# Patient Record
(demographics unavailable — no encounter records)

---

## 2024-11-07 NOTE — REASON FOR VISIT
[Spouse] : spouse [de-identified] : L2-L3 laminectomy and evacuation of EDH [de-identified] : 10/23/24

## 2024-11-07 NOTE — REASON FOR VISIT
[Spouse] : spouse [de-identified] : L2-L3 laminectomy and evacuation of EDH [de-identified] : 10/23/24

## 2024-11-07 NOTE — HISTORY OF PRESENT ILLNESS
[FreeTextEntry1] : s/p laminectomy for synovial cyst excision doing well radicular pain gone left quad strength improving ambulating well wound clean dry and intact return in 4 weeks will start PT

## 2024-12-02 NOTE — ASSESSMENT
[FreeTextEntry1] : Doing well.   Will start outpatient PT.   F/u in 6-8 weeks.   Will call barring any issues.    I personally reviewed with the PA, this patient's history and physical exam findings, as documented above. I have discussed the relevant areas of concern, having direct implications to the presenting problems and illnesses, and I have personally examined all pertinent and positive and negative findings, which impact their neurosurgical treatment.  MS MAYELA Fabian-C Senior Physician Assistant [facility]    Rose Morin MD FAANS Chair, Department of Neurosurgery [facility]

## 2024-12-02 NOTE — HISTORY OF PRESENT ILLNESS
[FreeTextEntry1] : Patient is doing well.   Preop pain / radiculopathy and lower extremity weakness has improved.  Reports some pain / spasms in the left buttock / thigh by the end of the day. This is bothersome at the time, but tolerable and relieved with rest.    Incision healing well. No signs of erythema, infection, or discharge.   Motor exam 5/5.   Gait steady, walking without assistance.

## 2024-12-23 NOTE — HISTORY OF PRESENT ILLNESS
[TextBox_4] : 65 yo M with PMHx of elevated PSA (followed by Urologist), Spinal Stenosis SP L2-L3 Laminectomy 6 weeks ago, GERD presents with complaint of BERGER noted after climbing 15-20 steps, patient states he feels winded afterwards, however able to walks "miles" on flat surface, is active at the gym with weight lifting.   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

## 2025-01-15 NOTE — ASSESSMENT
[FreeTextEntry1] : Is a 64-year-old male who presents for neurosurgical postoperative assessment status post L2-3 laminectomy for excision of synovial cyst.  As mentioned previously, he had evidence of a left L2-3 synovial cyst with significant compression on the nerve roots of the cauda equina.  Patient had presented to the emergency department with significant proximal weakness as well as radicular features which correlated with his radiographic findings.  He has responded quite well postoperatively and there is no additional interventions that are warranted at this time.  We will have him return to the office in approximately 2 to 3 months and can likely return to us as needed after that time.  He can contact me with any concerns in the interim.  CARLOS aBllesteros MD

## 2025-01-15 NOTE — HISTORY OF PRESENT ILLNESS
[FreeTextEntry1] : This is a 65 yo M who presents for neurosurgical post-operative assessment s/p L2-3 laminectomy for excision of extradural lesion consistent with synovial cyst.  Postoperatively he has noted excellent relief.  He reports minimal low back tightness or tenderness which is typically brought on by heightened activity.  He maintains the ability to ambulate independently.  He is without any referred numbness, tingling, burning, sharp shooting pain into the groin/lower extremities.  He is able to work full-time and often puts an 18+ hour days without limitation.  No bladder/bowel deficit.  EXAM Wound well-healed, no evidence of erythema, edema, warmth or tenderness noted. Ambulatory without an assistive device.

## 2025-02-25 NOTE — HISTORY OF PRESENT ILLNESS
[Initial Evaluation] : an initial evaluation of [Snoring] : snoring [Unrefreshing Sleep] : unrefreshing sleep [Currently Experiencing] : The patient is currently experiencing symptoms. [Sore Throat] : sore throat [Dry Throat] : dry throat [TextBox_4] : Patient states he continues to experience BERGER, lives on 3 floor in building does not take the elevator and after climbing 30 steps is out of breath, states he also hears wheezing at night, awaiting follow up with Cardiologist however previously told all workup with unremarkable

## 2025-02-25 NOTE — ASSESSMENT
[FreeTextEntry1] : AB CAMEJO Heavy Second Hand Smoking HO Exposure to sawdust during work for 11 years  Never smoker

## 2025-02-25 NOTE — REASON FOR VISIT
[Follow-Up] : a follow-up visit [Sleep Apnea] : sleep apnea [Shortness of Breath] : shortness of breath [Other: _____] : [unfilled]

## 2025-04-14 NOTE — HISTORY OF PRESENT ILLNESS
[Follow-Up - Routine Clinic] : a routine clinic follow-up of [Snoring] : snoring [Unrefreshing Sleep] : unrefreshing sleep [Currently Experiencing] : The patient is currently experiencing symptoms. [Shortness of Breath] : shortness of breath [Sore Throat] : sore throat [Dry Throat] : dry throat [TextBox_4] : 4/14/25: Patient states he continues to experience BERGER, lives on 3 floor in building does not take the elevator and after climbing 30 steps is out of breath, states he also hears wheezing at night, states he has only used the albuterol maybe twice since last visit, last time 2 sundays prior states he notes some relief with use. Patient also endorses intermittent cough, has history of GERD, of recent have noticed abdominal discomfort and BRBPR turning toilet bowel red in color, states it happened once, next appt with Dr. Ramirze is in August, was told to report to ED if reoccurs, states history of hemorrhoids.

## 2025-04-14 NOTE — ASSESSMENT
[FreeTextEntry1] : BERGER Moderate OSKAR on HST HO GERD HO Heavy Second Hand Smoking HO Exposure to sawdust during work for 11 years  Never smoker

## 2025-04-14 NOTE — ASSESSMENT
[FreeTextEntry1] : Mr. MARQUIS is a 63yo M status post L2-3 laminectomy with resection of synovial cyst on 10/23/24 presenting for routine follow-up. He is doing excellent and continues to report significant improvement in pre-operative symptoms. He reports mild tightness to his low back. He has responded quite well postoperatively and there is no additional interventions that are warranted at this time. At home exercises for his lumbar spine were provided. He may follow up as needed. Advised to call office barring any questions, concerns, or acute changes.    Ashley Guido MS FNP-BC Nurse Practitioner [facility]   Rose Morin MD FAANS Chair, Department of Neurosurgery [facility]

## 2025-04-14 NOTE — REVIEW OF SYSTEMS
[Wheezing] : wheezing [SOB on Exertion] : sob on exertion [GERD] : gerd [Abdominal Pain] : abdominal pain [Bleeding] : bleeding [Negative] : Endocrine

## 2025-06-23 NOTE — ASSESSMENT
[FreeTextEntry1] : Moderate OSKAR on HST awaiting titration study BERGER HO GERD HO Heavy Second Hand Smoking HO Exposure to sawdust during work for 11 years  Never smoker

## 2025-06-23 NOTE — HISTORY OF PRESENT ILLNESS
[Follow-Up - Routine Clinic] : a routine clinic follow-up of [Snoring] : snoring [Unrefreshing Sleep] : unrefreshing sleep [Currently Experiencing] : The patient is currently experiencing symptoms. [Shortness of Breath] : shortness of breath [Sore Throat] : sore throat [Dry Throat] : dry throat [TextBox_4] : 6/23/25: Patient reports recent URI likely viral in May with productive cough of yellow phelgm for 2 months duration, was seen in Urgent Care did not have CXR performed, discharged with anti-tussive syrup. Girlfriend also became ill following patient and still not back to baseline. Patient last reached for his Albuterol 3 weeks ago, states as of 2 days ago he is nearly back to baseline with residual no longer productive intermittent cough, did not cough once during visit.   4/14/25: Patient states he continues to experience BERGER, lives on 3 floor in building does not take the elevator and after climbing 30 steps is out of breath, states he also hears wheezing at night, states he has only used the albuterol maybe twice since last visit, last time 2 sundays prior states he notes some relief with use. Patient also endorses intermittent cough, has history of GERD, of recent have noticed abdominal discomfort and BRBPR turning toilet bowel red in color, states it happened once, next appt with Dr. Ramirez is in August, was told to report to ED if reoccurs, states history of hemorrhoids.

## 2025-06-23 NOTE — REVIEW OF SYSTEMS
Addended by: SCHWAB, NITA M on: 9/6/2018 02:18 PM     Modules accepted: Orders, Level of Service, SmartSet     [SOB on Exertion] : sob on exertion [GERD] : gerd [Abdominal Pain] : abdominal pain [Bleeding] : bleeding [Negative] : Endocrine [Wheezing] : no wheezing [TextBox_69] : history of the following, pending Gi

## 2025-07-24 NOTE — END OF VISIT
[FreeTextEntry3] :  I, Dr. Ramirez, personally performed the evaluation and management (E/M) services for this new patient. That E/M includes conducting the clinically appropriate initial history &/or exam, assessing all conditions, and establishing the plan of care. Today, my LON, was here to observe my evaluation and management service for this patient & follow plan of care established by me going forward.  [Time Spent: ___ minutes] : I have spent [unfilled] minutes of time on the encounter which excludes teaching and separately reported services.

## 2025-07-24 NOTE — PHYSICAL EXAM
[Alert] : alert [Normal Voice/Communication] : normal voice/communication [No Acute Distress] : no acute distress [Well Developed] : well developed [Well Nourished] : well nourished [Sclera] : the sclera and conjunctiva were normal [Hearing Threshold Finger Rub Not [place]] : hearing was normal [Normal Appearance] : the appearance of the neck was normal [No Respiratory Distress] : no respiratory distress [No Acc Muscle Use] : no accessory muscle use [Respiration, Rhythm And Depth] : normal respiratory rhythm and effort [Auscultation Breath Sounds / Voice Sounds] : lungs were clear to auscultation bilaterally [Heart Rate And Rhythm] : heart rate was normal and rhythm regular [Normal S1, S2] : normal S1 and S2 [Bowel Sounds] : normal bowel sounds [Abdomen Tenderness] : non-tender [Abdomen Soft] : soft [Abnormal Walk] : normal gait [Normal Color / Pigmentation] : normal skin color and pigmentation [Oriented To Time, Place, And Person] : oriented to person, place, and time

## 2025-07-24 NOTE — ASSESSMENT
[FreeTextEntry1] : 65 year old male patient with HLP, average risk for CRC, hx of colon TAs removed in 2017, recommended colonoscopy in 2020 but not done due to COVID pandemic, H/O non complicated descending colon diverticulitis in 2021 presents here today for further evaluation of blood in the stool.     Blood in the stool, likely due to hemorrhoids but need to R/O other GI etiologies, resolved now - Will schedule a colonoscopy; risks vs benefits discussed - Sutabs sent to his pharmacy   GERD - Will schedule an EGD; risks vs benefits discussed - Restart Pantoprazole 40 mg 1/2 hr before breakfast and famotidine 40 mg at hs - GERD diet discussed and he was told to avoid eating or drinking within 3 hrs of hs     I have spent 50 minutes of time on the encounter which excludes teaching time and/or separately reported services.

## 2025-07-24 NOTE — HISTORY OF PRESENT ILLNESS
[FreeTextEntry1] : 65 year old male patient with HLP, average risk for CRC, hx of colon TAs removed in 2017, recommended colonoscopy in 2020 but not done due to COVID pandemic, H/O non complicated descending colon diverticulitis in 2021 presents here today for further evaluation of blood in the stool.   Pt reports that he had noted blood in the stool 4-5 months ago x 2 episodes, BRB was mixed in the stool. He denied straining with his BMs. He saw his PCP and was told to stop Creatin that he was taking. He stopped it and he has not noted any further blood in the stool. He also C/O occasional heartburn, about 2 x a week. He ran out of famotidine and pantoprazole so he has been taking otc nexium with relief. He sometimes wakes up with heartburn.  He denied abd pain, weight loss, constipation, diarrhea, vomiting, or dysphagia.   Colonoscopy done 8/22: 1 TA in the AC, moderate diverticulosis of the whole colon, and external hemorrhoids. Repeat recommended in 5 yrs  EGD done 3/22: Gr 1 Esophagitis c/w reflux esophagitis, an erythematous congested nodule noted at the GEJ suggestive of inflammatory changes, non-erosive gastritis, and normal duodenum.